# Patient Record
(demographics unavailable — no encounter records)

---

## 2024-10-10 NOTE — HISTORY OF PRESENT ILLNESS
[Headaches] : no headaches [Visual Symptoms] : no ~T visual symptoms [Polyuria] : no polyuria [Polydipsia] : no polydipsia [Knee Pain] : no knee pain [Hip Pain] : no hip pain [Constipation] : no constipation [Fatigue] : no fatigue [Anorexia] : no anorexia [Abdominal Pain] : no abdominal pain [Nausea] : no nausea [Vomiting] : no vomiting [FreeTextEntry2] : Tiff is a 14 year 7 month old girl with hypothyroidism due to Hashimoto's thyroiditis here for follow-up. She was initially seen 10/6/16 by me. Her pediatrician had noticed a goiter on physical exam; laboratory testing showed TSH elevated to 8.97 uIU/ml, FT4 was normal at 0.9 ng/dl, T4 was normal at 5.6 ug/dl. Thyroglobulin antibody level was positive. At her initial visit a goiter was noted on examination.  Repeat TFT's done in March, 2017 showed further elevation of TSH to 15.68 uIU/mL, and normal T4 of 6.1 ug/dL, therefore she was started on Levothyroxine and dose has been increased to achieve normal thyroid function. She was last seen by me in April, 2024 at which time thyroid tests were normal and her levothyroxine dose was continued at 75 mcg daily.   Tiff returns for follow up of her hypothyroidism. She reports that she has been healthy in the interim. She is taking 75 mcg levothyroxine daily with1-2 missed doses, 30-60 minutes before breakfast.

## 2024-10-10 NOTE — ASSESSMENT
[FreeTextEntry1] : 14 year 7 month old girl with Hashimoto's thyroiditis and hypothyroidism who presents for follow-up. She is clinically euthyroid. Repeat thyroid tests will be done to determine if her levothyroxine dose needs to be adjusted. She will follow up with me in 6 months.

## 2024-10-10 NOTE — PHYSICAL EXAM
Post-Procedure: Following the procedure post care was reviewed with the patient. Post-Care Instructions: I reviewed with the patient in detail post-care instructions. Patient should stay away from the sun and wear sun protection until treated areas are fully healed. Medical Necessity Clause: This procedure was medically necessary because the lesions that were treated were: Pre-Procedure: Prior to the procedure all persons present put on protective eyewear. Pulse Duration: 1.5 ms Fluence: 11 Laser Type: Vbeam 595nm Detail Level: Zone Add 52 Modifier (Optional): no Spot Size: 7 mm Medical Necessity Information: It is in your best interest to select a reason for this procedure from the list below. All of these items fulfill various CMS LCD requirements except the new and changing color options. Anesthesia Volume In Cc: 0 Immediate Endpoint (Optional): erythema and purpura Consent: Written consent obtained, risks reviewed including but not limited to crusting, scabbing, blistering, scarring, darker or lighter pigmentary change, incidental hair removal, bruising, and/or incomplete removal. [Murmur] : no murmurs Delay Time (Ms): 20 [de-identified] : mildly enlarged  Cryogen Time (Ms): 30

## 2025-04-10 NOTE — CONSULT LETTER
[Dear  ___] : Dear  [unfilled], [Courtesy Letter:] : I had the pleasure of seeing your patient, [unfilled], in my office today. [Please see my note below.] : Please see my note below. [Consult Closing:] : Thank you very much for allowing me to participate in the care of this patient.  If you have any questions, please do not hesitate to contact me. [Sincerely,] : Sincerely, [FreeTextEntry3] : Landy Castaneda MD

## 2025-04-10 NOTE — PHYSICAL EXAM
[Healthy Appearing] : healthy appearing [Well Nourished] : well nourished [Interactive] : interactive [Normal Appearance] : normal appearance [Well formed] : well formed [Normally Set] : normally set [None] : there were no thyroid nodules [Normal S1 and S2] : normal S1 and S2 [Clear to Ausculation Bilaterally] : clear to auscultation bilaterally [Abdomen Soft] : soft [Abdomen Tenderness] : non-tender [Normal] : normal  [Murmur] : no murmurs [de-identified] : mildly enlarged

## 2025-04-10 NOTE — PHYSICAL EXAM
[Healthy Appearing] : healthy appearing [Well Nourished] : well nourished [Interactive] : interactive [Normal Appearance] : normal appearance [Well formed] : well formed [Normally Set] : normally set [None] : there were no thyroid nodules [Normal S1 and S2] : normal S1 and S2 [Clear to Ausculation Bilaterally] : clear to auscultation bilaterally [Abdomen Soft] : soft [Abdomen Tenderness] : non-tender [Normal] : normal  [Murmur] : no murmurs [de-identified] : mildly enlarged

## 2025-04-10 NOTE — HISTORY OF PRESENT ILLNESS
[Regular Periods] : regular periods [Headaches] : no headaches [Visual Symptoms] : no ~T visual symptoms [Polyuria] : no polyuria [Polydipsia] : no polydipsia [Knee Pain] : no knee pain [Hip Pain] : no hip pain [Constipation] : no constipation [Cold Intolerance] : no cold intolerance [Fatigue] : no fatigue [Anorexia] : no anorexia [Abdominal Pain] : no abdominal pain [Nausea] : no nausea [Vomiting] : no vomiting [FreeTextEntry2] : Tiff is a 15 year old girl with hypothyroidism due to Hashimoto's thyroiditis here for follow-up. She was initially seen 10/6/16 by me. Her pediatrician had noticed a goiter on physical exam; laboratory testing showed TSH elevated to 8.97 uIU/ml, FT4 was normal at 0.9 ng/dl, T4 was normal at 5.6 ug/dl. Thyroglobulin antibody level was positive. At her initial visit a goiter was noted on examination.  Repeat TFT's done in March, 2017 showed further elevation of TSH to 15.68 uIU/mL, and normal T4 of 6.1 ug/dL, therefore she was started on Levothyroxine and dose has been increased to achieve normal thyroid function. She was last seen by Dr Castaneda in October, 2024 at which time thyroid tests were normal and her levothyroxine dose was continued at 75 mcg daily.   Tiff returns for follow up of her hypothyroidism. She reports that she has been healthy in the interim. She is taking 75 mcg levothyroxine 6x per wk with no missed doses, 30-60 minutes before breakfast.   [FreeTextEntry1] : LMP couple weeks ago.

## 2025-04-10 NOTE — ADDENDUM
[FreeTextEntry1] : left VM for FOC - TFTs normal, continue same dose. Will send result letter as well.

## 2025-04-10 NOTE — PHYSICAL EXAM
[Healthy Appearing] : healthy appearing [Well Nourished] : well nourished [Interactive] : interactive [Normal Appearance] : normal appearance [Well formed] : well formed [Normally Set] : normally set [None] : there were no thyroid nodules [Normal S1 and S2] : normal S1 and S2 [Clear to Ausculation Bilaterally] : clear to auscultation bilaterally [Abdomen Soft] : soft [Abdomen Tenderness] : non-tender [Normal] : normal  [Murmur] : no murmurs [de-identified] : mildly enlarged

## 2025-04-10 NOTE — ASSESSMENT
[FreeTextEntry1] : 15 year old girl with Hashimoto's thyroiditis and hypothyroidism who presents for follow-up. She is clinically euthyroid. Repeat thyroid tests will be done to determine if her levothyroxine dose needs to be adjusted. She will follow up with Dr Castaneda in 6 months.